# Patient Record
Sex: MALE | Race: WHITE
[De-identification: names, ages, dates, MRNs, and addresses within clinical notes are randomized per-mention and may not be internally consistent; named-entity substitution may affect disease eponyms.]

---

## 2020-08-04 ENCOUNTER — HOSPITAL ENCOUNTER (EMERGENCY)
Dept: HOSPITAL 88 - FSED | Age: 60
Discharge: HOME | End: 2020-08-04
Payer: COMMERCIAL

## 2020-08-04 VITALS — WEIGHT: 226 LBS | HEIGHT: 69 IN | BODY MASS INDEX: 33.47 KG/M2
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
## 2020-08-04 DIAGNOSIS — S61.212A: Primary | ICD-10-CM
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
## 2020-08-04 PROCEDURE — 90471 IMMUNIZATION ADMIN: CPT

## 2020-08-04 PROCEDURE — 99283 EMERGENCY DEPT VISIT LOW MDM: CPT

## 2020-08-04 PROCEDURE — 90714 TD VACC NO PRESV 7 YRS+ IM: CPT

## 2020-08-04 NOTE — EMERGENCY DEPARTMENT NOTE
History of Present Illnes


History of Present Illness


Chief Complaint:  Laceration rgt 3rd digit s/p opening a can at work


History of Present Illness


This is a 59 year old  male. was doing well prior to this.


Historian:  Patient


Arrival Mode:  Car


History limited by:  condition of the patient


 Required:  No


Onset (how long ago):  hour(s) (1)


Location:  rgt middle finger


Quality:  sharp


Radiation:  Reports non-radiation


Severity:  moderate


Onset quality:  sudden


Duration (how long):  hour(s) (1)


Progression:  unchanged


Chronicity:  new


Context:  Reports trauma/injury; 


   Denies recent illness, Denies recent surgery, Denies recent immobilization, D

enies recent travel, Denies new medications, Denies hx of DVT/PE, Denies non-

compliance w/ medications


Relieving factors:  none


Exacerbating factors:  movement


Associated symptoms:  Reports denies other symptoms


Treatments prior to arrival:  none





Past Medical/Family History


Physician Review


I have reviewed the patient's past medical and family history.  Any updates have

been documented here.





Past Medical History


Recent Fever:  No


Clinical Suspicion of Infectio:  No


New/Unexplained Change in Ment:  No


Past Medical History:  None


Other Surgery:  


hand surgery





Social History


Smoking Cessation:  Never Smoker


Counseling Performed:  No


Alcohol Use:  Occasional


Any Illegal Drug Use:  No


Physically hurt or threatened:  No





Other


Any Pre-Existing Lines (PICC,:  No





Review of Systems


Review of Systems


Constitutional:  Reports no symptoms


EENTM:  Reports no symptoms


Cardiovascular:  Reports no symptoms


Respiratory:  Reports no symptoms


Gastrointestinal:  Reports no symptoms


Genitourinary:  Reports no symptoms


Musculoskeletal:  Reports no symptoms


Integumentary:  Reports as per HPI


Neurological:  Reports other (numbness at tip of middle finger)


Psychological:  Reports no symptoms


Endocrine:  Reports no symptoms


Hematological/Lymphatic:  Reports no symptoms





Physical Exam


Related Data


Allergies:  


Coded Allergies:  


     No Known Allergies (Unverified , 8/4/20)


Triage Vital Signs





Vital Signs








  Date Time  Temp Pulse Resp B/P (MAP) Pulse Ox O2 Delivery O2 Flow Rate FiO2


 


8/4/20 07:20 98.0 59 18 150/98 98   








Vital signs reviewed:  Yes





Physical Exam


CONSTITUTIONAL





Constitutional:  Present well-developed, Present well-nourished


HENT


HENT:  Present normocephalic, Present atraumatic, Present oropharynx 

clear/moist, Present nose normal


HENT L/R:  Present left ext ear normal, Present right ext ear normal


EYES





Eyes:  Reports PERRL, Reports conjunctivae normal


NECK


Neck:  Present ROM normal


PULMONARY


Pulmonary:  Present effort normal, Present breath sounds normal


CARDIOVASCULAR





Cardiovascular:  Present regular rhythm, Present heart sounds normal, Present 

capillary refill normal, Present normal rate


GASTROINTESTINAL





Abdominal:  Present soft, Present nontender, Present bowel sounds normal


GENITOURINARY





Genitourinary:  Present exam deferred


SKIN


Skin:  Present warm, Present other (3 cm laceration rgt middle finger at the pip

joint, volar surface)


MUSCULOSKELETAL





Musculoskeletal:  Present ROM normal


NEUROLOGICAL





Neurological:  Present alert, Present oriented x 3, Present other (+numbness 

distal rgt middle digit)


PSYCHOLOGICAL


Psychological:  Present mood/affect normal, Present judgement normal





Procedures


Laceration


Laceration:  Laceration 1


Site:  hand (right middle finger on volar surface at the pip 3cm)


Side:  right


Size (cm):  3


Description:  linear, clean


Depth:  simple, single layer


Local anesthesia:  lidocaine 1%


Amount of anesthesia (mL):  7


Pre-repair:  irrigated extensively


Skin layer closed with:  nylon


Size (cm):  5-0


Number of sutures:  3


Technique:  simple, interrupted


Additional comments


no complications





Assessment & Plan


Medical Decision Making


MDM


finger laceration





Assessment & Plan


Final Impression:  


(1) Finger laceration


(2) Paresthesia


Depart Disposition:  HOME, SELF-CARE


Last Vital Signs











  Date Time  Temp Pulse Resp B/P (MAP) Pulse Ox O2 Delivery O2 Flow Rate FiO2


 


8/4/20 07:20 98.0 59 18 150/98 98   








Home Meds


Active Scripts


Sulfamethoxazole/Trimethoprim (BACTRIM DS TABLET) 1 Each Tablet, 1 TAB PO Q12H, 

#20 TAB


   Prov:BRIELLE PAYNE         8/4/20


Medications in the ED





Tetanus/ Diphtheria Toxoids 0.5 ml ONCE  ONCE IM ;  Start 8/4/20 at 08:00;  Stop

8/4/20 at 08:01


Neomycin/ Polymyxin/ Bacitracin  ONCE  ONCE TOP ;  Start 8/4/20 at 08:00;  Stop 

8/4/20 at 08:01











BRIELLE PAYNE               Aug 4, 2020 08:10